# Patient Record
Sex: MALE | Race: WHITE | NOT HISPANIC OR LATINO | Employment: UNEMPLOYED | URBAN - METROPOLITAN AREA
[De-identification: names, ages, dates, MRNs, and addresses within clinical notes are randomized per-mention and may not be internally consistent; named-entity substitution may affect disease eponyms.]

---
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
## 2024-05-15 ENCOUNTER — APPOINTMENT (OUTPATIENT)
Dept: LAB | Facility: CLINIC | Age: 19
End: 2024-05-15
Payer: COMMERCIAL

## 2024-05-15 DIAGNOSIS — Z01.818 PRE-OP TESTING: ICD-10-CM

## 2024-05-15 DIAGNOSIS — J35.3 ENLARGED TONSILS AND ADENOIDS: ICD-10-CM

## 2024-05-15 DIAGNOSIS — J03.01 RECURRENT STREPTOCOCCAL TONSILLITIS: ICD-10-CM

## 2024-05-15 LAB
ANION GAP SERPL CALCULATED.3IONS-SCNC: 9 MMOL/L (ref 4–13)
BASOPHILS # BLD AUTO: 0.02 THOUSANDS/ÂΜL (ref 0–0.1)
BASOPHILS NFR BLD AUTO: 0 % (ref 0–1)
BUN SERPL-MCNC: 14 MG/DL (ref 5–25)
CALCIUM SERPL-MCNC: 9.3 MG/DL (ref 8.4–10.2)
CHLORIDE SERPL-SCNC: 105 MMOL/L (ref 96–108)
CO2 SERPL-SCNC: 27 MMOL/L (ref 21–32)
CREAT SERPL-MCNC: 1.06 MG/DL (ref 0.6–1.3)
EOSINOPHIL # BLD AUTO: 0.05 THOUSAND/ÂΜL (ref 0–0.61)
EOSINOPHIL NFR BLD AUTO: 1 % (ref 0–6)
ERYTHROCYTE [DISTWIDTH] IN BLOOD BY AUTOMATED COUNT: 11.8 % (ref 11.6–15.1)
GFR SERPL CREATININE-BSD FRML MDRD: 100 ML/MIN/1.73SQ M
GLUCOSE SERPL-MCNC: 115 MG/DL (ref 65–140)
HCT VFR BLD AUTO: 48.2 % (ref 36.5–49.3)
HGB BLD-MCNC: 15.9 G/DL (ref 12–17)
IMM GRANULOCYTES # BLD AUTO: 0.01 THOUSAND/UL (ref 0–0.2)
IMM GRANULOCYTES NFR BLD AUTO: 0 % (ref 0–2)
LYMPHOCYTES # BLD AUTO: 2.69 THOUSANDS/ÂΜL (ref 0.6–4.47)
LYMPHOCYTES NFR BLD AUTO: 38 % (ref 14–44)
MCH RBC QN AUTO: 29.4 PG (ref 26.8–34.3)
MCHC RBC AUTO-ENTMCNC: 33 G/DL (ref 31.4–37.4)
MCV RBC AUTO: 89 FL (ref 82–98)
MONOCYTES # BLD AUTO: 0.53 THOUSAND/ÂΜL (ref 0.17–1.22)
MONOCYTES NFR BLD AUTO: 8 % (ref 4–12)
NEUTROPHILS # BLD AUTO: 3.81 THOUSANDS/ÂΜL (ref 1.85–7.62)
NEUTS SEG NFR BLD AUTO: 53 % (ref 43–75)
NRBC BLD AUTO-RTO: 0 /100 WBCS
PLATELET # BLD AUTO: 212 THOUSANDS/UL (ref 149–390)
PMV BLD AUTO: 11.8 FL (ref 8.9–12.7)
POTASSIUM SERPL-SCNC: 4 MMOL/L (ref 3.5–5.3)
RBC # BLD AUTO: 5.41 MILLION/UL (ref 3.88–5.62)
SODIUM SERPL-SCNC: 141 MMOL/L (ref 135–147)
WBC # BLD AUTO: 7.11 THOUSAND/UL (ref 4.31–10.16)

## 2024-05-15 PROCEDURE — 80048 BASIC METABOLIC PNL TOTAL CA: CPT

## 2024-05-15 PROCEDURE — 36415 COLL VENOUS BLD VENIPUNCTURE: CPT

## 2024-05-15 PROCEDURE — 85025 COMPLETE CBC W/AUTO DIFF WBC: CPT

## 2024-06-03 ENCOUNTER — HOSPITAL ENCOUNTER (EMERGENCY)
Facility: HOSPITAL | Age: 19
Discharge: HOME | End: 2024-06-03
Attending: EMERGENCY MEDICINE
Payer: COMMERCIAL

## 2024-06-03 VITALS
HEART RATE: 67 BPM | RESPIRATION RATE: 20 BRPM | TEMPERATURE: 97.8 F | SYSTOLIC BLOOD PRESSURE: 120 MMHG | OXYGEN SATURATION: 97 % | DIASTOLIC BLOOD PRESSURE: 67 MMHG

## 2024-06-03 DIAGNOSIS — Z90.89 STATUS POST TONSILLECTOMY: Primary | ICD-10-CM

## 2024-06-03 PROCEDURE — 99283 EMERGENCY DEPT VISIT LOW MDM: CPT

## 2024-06-03 PROCEDURE — 99284 EMERGENCY DEPT VISIT MOD MDM: CPT | Performed by: EMERGENCY MEDICINE

## 2024-06-03 PROCEDURE — 99024 POSTOP FOLLOW-UP VISIT: CPT | Performed by: OTOLARYNGOLOGY

## 2024-06-03 RX ORDER — TRANEXAMIC ACID 100 MG/ML
1000 INJECTION, SOLUTION INTRAVENOUS ONCE
Status: COMPLETED | OUTPATIENT
Start: 2024-06-03 | End: 2024-06-03

## 2024-06-03 RX ADMIN — TRANEXAMIC ACID 1000 MG: 100 INJECTION, SOLUTION INTRAVENOUS at 09:35

## 2024-06-03 NOTE — ED ATTENDING ATTESTATION
6/3/2024  I, Weston Almazan MD, saw and evaluated the patient. I have discussed the patient with the resident/non-physician practitioner and agree with the resident's/non-physician practitioner's findings, Plan of Care, and MDM as documented in the resident's/non-physician practitioner's note, except where noted. All available labs and Radiology studies were reviewed.  I was present for key portions of any procedure(s) performed by the resident/non-physician practitioner and I was immediately available to provide assistance.       At this point I agree with the current assessment done in the Emergency Department.  I have conducted an independent evaluation of this patient a history and physical is as follows:    ED Course     Patient presents for evaluation due to bleeding from his mouth.  Patient states that he had a tonsillectomy 2 weeks ago and was doing well until this morning when he tasted blood in his mouth and noticed some bleeding.  He does report that it feels different to swallow.  He denies eating any hard foods.  No additional complaints.  Exam: AAOx3, NAD, right tonsil with large clot, no active bleeding.  A/P: Postop tonsillar bleeding.  Will give nebulized TXA and discuss with ENT.    Critical Care Time  Procedures

## 2024-06-03 NOTE — CONSULTS
OTOLARYNGOLOGY CONSULT    Date of Service: 6/3/2024    Reason for consult: Tonsil bleeding      ASSESSMENT/PLAN:  Joo Kline is a 18 y.o. male who we are consulted on for post tonsillectomy bleeding. No active bleeding. Right sided clot dissolving with hydrogen peroxide and cold water with small residual clot that is stable.     -no acute surgical ENT intervention required at this time  -continue cold water gargles at home to slowly dissolve remaining clot   -ok for d/c this afternoon with strict return precautions if re-bleeding for OR management       HPI  This patient is a 17 yo M s/p tonsillectomy 5/22 for recurrent tonsillitis. Reports that he has no bleeding until last night. Last night he noticed the taste of blood in his mouth. He spit and there was ange blood. The bleeding stopped but when he woke up this morning he noticed more oozing he tasted in the mouth. Report no inciting incident. Denies pain, nausea, vomiting. No hx of personal or family bleeding disorders.     In the ED a large R clot was noted without active bleeding. Patient was given TXA. Hydrogen peroxide and cold water reduced the size of the clot significantly.       CURRENT HOSPITAL MEDICATIONS  No current facility-administered medications for this encounter.     Current Outpatient Medications   Medication Sig Dispense Refill    methylPREDNISolone 4 MG tablet therapy pack Use as directed on package 1 tablet 0    oxyCODONE (Roxicodone) 5 immediate release tablet Take 1 tablet (5 mg total) by mouth every 4 (four) hours as needed for moderate pain Max Daily Amount: 30 mg 30 tablet 0       REVIEW OF SYSTEMS  As above    HISTORIES  PMH:  No past medical history on file.    PSH:  Past Surgical History:   Procedure Laterality Date    EAR SURGERY Left     TONSILLECTOMY AND ADENOIDECTOMY         SocHx:  Social History     Tobacco Use    Smoking status: Never       FH:  No family history on file.    ALLERGIES:  No Known Allergies    PHYSICAL  EXAM  Visit Vitals  /67   Pulse 67   Temp 97.8 °F (36.6 °C) (Temporal)   Resp 20   SpO2 97%   Smoking Status Never       General: NAD  Ears:  External ears normal in appearance   Nose:  External appearance normal, no septal deviation  Oral cavity:  L healing tonsillar fossa, R clot, dissolving without active bleeding  Neck: Trachea is midline; no thyroid nodules, Salivary glands symmetrical, no masses/abnormality on palpation  Lymph:  No cervical lymphadenopathy  Skin:  No obvious facial lesions  Neuro: Motor and sensory grossly intact. Face symmetrical, no obvious cranial nerve palsies,motor and sensory grossly intact, no focal deficits.   Lungs:  Normal work of breathing, symmetrical chest expansion  Vascular: Well perfused      LABORATORY  Reviewed    PROCEDURES  None    RADIOLOGY  None    There are no problems to display for this patient.      Milagros Soler, PGY2  Otolaryngology- Head and Neck Surgery  Please contact [facility] E[facility] resident role

## 2024-06-03 NOTE — ED PROVIDER NOTES
Chief Complaint   Patient presents with    Post-op Problem     Pt reports tonsillectomy 2 weeks ago. Pt woke up today and noticed blood in his mouth. Pt reports swelling in throat/pain.       History of Present Illness and Review of Systems   This is a 18 y.o. male with no major PMH coming in today with complaint of postoperative problem.  Patient had a tonsillectomy on 5/22.  States that bleeding and pain were controlled postoperatively however this morning he spit up some blood and came here for further evaluation.  Has not trialed anything p.o. this morning.  Last night he was asymptomatic and doing well.  No history of bleeding disorders, cough, vomiting, fevers or infectious episodes.  He is otherwise healthy with no major medical problems.  No other symptoms currently.    - No language barrier.     No other complaints for this encounter.    Remainder of ROS Reviewed and Non-Pertinent    Past Medical, Past Surgical History:    has no past medical history on file.   has a past surgical history that includes EAR SURGERY (Left) and Tonsillectomy and adenoidectomy.     Allergies:   No Known Allergies    Social and Family History:     Social History     Substance and Sexual Activity   Alcohol Use Not on file     Social History     Tobacco Use   Smoking Status Never   Smokeless Tobacco Not on file     Social History     Substance and Sexual Activity   Drug Use Not on file       Physical Examination     Vitals:    06/03/24 0919 06/03/24 1053   BP: 151/79 120/67   BP Location: Right arm    Pulse: 95 67   Resp: 18 20   Temp: 97.8 °F (36.6 °C)    TempSrc: Temporal    SpO2: 99% 97%       Physical Exam  Vitals and nursing note reviewed.   Constitutional:       General: He is not in acute distress.     Appearance: He is well-developed.   HENT:      Head: Normocephalic and atraumatic.      Mouth/Throat:      Comments: Large clot noted in the right peritonsillar region, slow active oozing, no stridor, hoarseness, vomiting, or  tripoding on examination, patient phonating without difficulty  Eyes:      Conjunctiva/sclera: Conjunctivae normal.   Cardiovascular:      Rate and Rhythm: Normal rate and regular rhythm.      Heart sounds: No murmur heard.  Pulmonary:      Effort: Pulmonary effort is normal. No respiratory distress.      Breath sounds: Normal breath sounds.   Abdominal:      Palpations: Abdomen is soft.      Tenderness: There is no abdominal tenderness.   Musculoskeletal:         General: No swelling.      Cervical back: Neck supple.   Skin:     General: Skin is warm and dry.      Capillary Refill: Capillary refill takes less than 2 seconds.      Coloration: Skin is not pale.   Neurological:      General: No focal deficit present.      Mental Status: He is alert.   Psychiatric:         Mood and Affect: Mood normal.           Procedures   Procedures      MDM:   Medical Decision Making  Joo Kline is a 18 y.o. who presents with complaints of postoperative problem    Vitals: Stable  Physical Exam: Patient has a large clot in the right peritonsillar region, resting comfortably in bed and phonating without difficulty, currently protecting his airway, no evidence of massive hemorrhage, otherwise appears his baseline    Differential includes but not limited to: Post tonsillar bleeding, no evidence of airway compromise, hypovolemic shock, aspiration    Plan: TXA soaked gauze, observation, p.o. challenge, will reassess    Reassessment: After discussion with ENT, status post hydroperoxide gargle, TXA nebulized, patient had improvement of his clot burden.  As result, no indication for operative management at this point.  Advised on close outpatient follow-up and additionally if any recurrent bleeding he should return immediately to the emergency department.  The patient expressed understanding without further questions or concerns.    Risk  Prescription drug management.        - Reviewed relevant past office  "visits/hospitalizations/procedures  -Obtained pertinent history that influenced decision making from the patient    ED Course as of 06/03/24 1603   Mon Jun 03, 2024   0953 ENT TT sent   1010 ENT will come to bedside   1021 ENT at bedside   1258 Discussed with ENT. PT is now s/p observation for several hours with intermittent hydrogen peroxide/water gargling. Clot burden is decresaed. Not actively bleeding. Pt is cleared to go home with strict return precautions.      Final Dispo   Final Diagnosis:  1. Status post tonsillectomy      Time reflects when diagnosis was documented in both MDM as applicable and the Disposition within this note       Time User Action Codes Description Comment    6/3/2024 12:14 PM Orion Jenkins Add [Z90.89] Status post tonsillectomy           ED Disposition       ED Disposition   Discharge    Condition   Stable    Date/Time   Mon Evan 3, 2024 12:59 PM    Comment   Joo Kline discharge to home/self care.                   Follow-up Information    None       Medications   tranexamic acid 100mg/mL (for epistaxis) 1,000 mg (1,000 mg Nasal Given by Other 6/3/24 0935)       Risk Stratification Tools                Orders Placed This Encounter   Procedures    Inpatient consult to ENT       Labs:   Labs Reviewed - No data to display    Imaging:     No orders to display      All details of the evaluation and treatment plan were made clear and additionally all questions and concerns were addressed while under my care.    Portions of the record may have been created with voice recognition software. Occasional wrong word or \"sound a like\" substitutions may have occurred due to the inherent limitations of voice recognition software. Read the chart carefully and recognize, using context, where substitutions have occurred.     Orion Jenkins MD  06/03/24 1603    "

## 2024-06-03 NOTE — DISCHARGE INSTRUCTIONS
Gargle with ice water as instructed by Dr. Soler.     Return to the ER immediately if any repeat episodes of bleeding